# Patient Record
Sex: FEMALE | Race: BLACK OR AFRICAN AMERICAN | NOT HISPANIC OR LATINO | URBAN - METROPOLITAN AREA
[De-identification: names, ages, dates, MRNs, and addresses within clinical notes are randomized per-mention and may not be internally consistent; named-entity substitution may affect disease eponyms.]

---

## 2024-02-15 ENCOUNTER — LAB (OUTPATIENT)
Dept: URBAN - METROPOLITAN AREA CLINIC 118 | Facility: CLINIC | Age: 49
End: 2024-02-15

## 2024-02-15 ENCOUNTER — OV NP (OUTPATIENT)
Dept: URBAN - METROPOLITAN AREA CLINIC 118 | Facility: CLINIC | Age: 49
End: 2024-02-15
Payer: COMMERCIAL

## 2024-02-15 VITALS
DIASTOLIC BLOOD PRESSURE: 93 MMHG | HEIGHT: 62 IN | SYSTOLIC BLOOD PRESSURE: 135 MMHG | BODY MASS INDEX: 30.33 KG/M2 | TEMPERATURE: 97 F | HEART RATE: 76 BPM | WEIGHT: 164.8 LBS

## 2024-02-15 DIAGNOSIS — K62.5 RECTAL BLEEDING: ICD-10-CM

## 2024-02-15 DIAGNOSIS — Z12.11 SCREEN FOR COLON CANCER: ICD-10-CM

## 2024-02-15 DIAGNOSIS — R19.4 CHANGE IN BOWEL HABITS: ICD-10-CM

## 2024-02-15 PROCEDURE — 99204 OFFICE O/P NEW MOD 45 MIN: CPT | Performed by: INTERNAL MEDICINE

## 2024-02-15 NOTE — HPI-TODAY'S VISIT:
pt presents for colon cancer screening. pt reports occasional rectal bleeding which she feels is due to hemorrhoids. pt notes constipation with 2-3 bm's per week. No abdominal pain, diarrhea or other LGI symptoms. No recent weight loss or anemia. No UGI symptoms including nausea, vomiting, gerd or dysphagia. pt due for colonoscopy.

## 2024-03-13 ENCOUNTER — COLON (OUTPATIENT)
Dept: URBAN - METROPOLITAN AREA SURGERY CENTER 23 | Facility: SURGERY CENTER | Age: 49
End: 2024-03-13
Payer: COMMERCIAL

## 2024-03-13 DIAGNOSIS — Z12.11 COLON CANCER SCREENING: ICD-10-CM

## 2024-03-13 PROCEDURE — 45378 DIAGNOSTIC COLONOSCOPY: CPT | Performed by: INTERNAL MEDICINE
